# Patient Record
Sex: MALE | Race: WHITE | NOT HISPANIC OR LATINO
[De-identification: names, ages, dates, MRNs, and addresses within clinical notes are randomized per-mention and may not be internally consistent; named-entity substitution may affect disease eponyms.]

---

## 2017-02-07 ENCOUNTER — TRANSCRIPTION ENCOUNTER (OUTPATIENT)
Age: 82
End: 2017-02-07

## 2017-02-10 ENCOUNTER — EMERGENCY (EMERGENCY)
Facility: HOSPITAL | Age: 82
LOS: 1 days | Discharge: HOME | End: 2017-02-10
Attending: EMERGENCY MEDICINE | Admitting: EMERGENCY MEDICINE
Payer: MEDICARE

## 2017-02-10 VITALS
RESPIRATION RATE: 19 BRPM | DIASTOLIC BLOOD PRESSURE: 86 MMHG | SYSTOLIC BLOOD PRESSURE: 153 MMHG | TEMPERATURE: 97 F | OXYGEN SATURATION: 95 % | WEIGHT: 162.92 LBS | HEART RATE: 64 BPM | HEIGHT: 66 IN

## 2017-02-10 DIAGNOSIS — M54.9 DORSALGIA, UNSPECIFIED: ICD-10-CM

## 2017-02-10 DIAGNOSIS — Z98.890 OTHER SPECIFIED POSTPROCEDURAL STATES: ICD-10-CM

## 2017-02-10 DIAGNOSIS — E78.5 HYPERLIPIDEMIA, UNSPECIFIED: ICD-10-CM

## 2017-02-10 PROCEDURE — 99283 EMERGENCY DEPT VISIT LOW MDM: CPT

## 2017-02-10 PROCEDURE — 99283 EMERGENCY DEPT VISIT LOW MDM: CPT | Mod: GC

## 2017-02-10 RX ORDER — ACETAMINOPHEN 500 MG
650 TABLET ORAL ONCE
Qty: 0 | Refills: 0 | Status: COMPLETED | OUTPATIENT
Start: 2017-02-10 | End: 2017-02-10

## 2017-02-10 RX ORDER — OXYCODONE HYDROCHLORIDE 5 MG/1
1 TABLET ORAL
Qty: 12 | Refills: 0 | OUTPATIENT
Start: 2017-02-10 | End: 2017-02-13

## 2017-02-10 RX ORDER — OXYCODONE HYDROCHLORIDE 5 MG/1
5 TABLET ORAL ONCE
Qty: 0 | Refills: 0 | Status: DISCONTINUED | OUTPATIENT
Start: 2017-02-10 | End: 2017-02-10

## 2017-02-10 RX ADMIN — Medication 650 MILLIGRAM(S): at 07:42

## 2017-02-10 RX ADMIN — OXYCODONE HYDROCHLORIDE 5 MILLIGRAM(S): 5 TABLET ORAL at 07:42

## 2017-02-10 NOTE — ED PROVIDER NOTE - OBJECTIVE STATEMENT
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 Patient had similar pain in his left leg 2 weeks prior that has since resolved. At the time had gone to doctor and was prescribed muscle relaxer and ibuprofen with improvement in symptoms.  Patient had an MRI 1 month prior of his entire spine from his orthopedist without any significant disease. Also had a recent bladder biopsy which was negative for disease Took ibuprofen this morning with no relief. No urinary symptoms, no fevers, no chills, no weight loss, no fatigue, no night sweats, no nausea/vomiting, no diarrhea, no trauma, no weakness, no numbness/tingling. 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 Patient had similar pain in his left leg 2 weeks prior that has since resolved. At the time had gone to doctor and was prescribed muscle relaxer and ibuprofen with improvement in symptoms.  Patient had an MRI 1 month prior of his entire spine from his orthopedist without any significant disease. Also had a recent bladder biopsy which was negative for disease Took ibuprofen this morning with no relief. No urinary symptoms, no fevers, no chills, no weight loss, no fatigue, no night sweats, no nausea/vomiting, no diarrhea, no trauma, no weakness, no numbness/tingling, NO HIP pain.

## 2017-02-10 NOTE — ED ADULT NURSE NOTE - PSH
1960's surgery for herniated lumbar disc---re op 3 years later due to scar tissue    H/O hydrocephalus  sp placement of shunt  January 2009   surgery for C4-C6 Disc--no hardware    October 2010  left Carpal Tunnel release/Syndrome    S/P Tonsillectomy

## 2017-02-10 NOTE — ED PROVIDER NOTE - ATTENDING CONTRIBUTION TO CARE
80 yo male with pain radiating from R buttock down the back of the right leg to the knee; had similar pain on the L side recently, now resolved.  Also had MRI of the spine 1 month ago, normal per patient.  Very well appearing on exam.  Consistent with sciatica -- medicate and reassess, outpatient PT as needed.  No emergent spinal pathology.

## 2017-02-10 NOTE — ED ADULT NURSE NOTE - PMH
? alcohol abuse--admits to 1 vodka martini per day    Dyslipidemia    Hypercholesterolemia    Normal Pressure Hydrocephalus, as per medical consult--altered gait, uses a cane and "forgetfulness"    personal h/o Hematuria--etiology unknown after urology work up  patient does not recall this

## 2017-02-10 NOTE — ED PROVIDER NOTE - MEDICAL DECISION MAKING DETAILS
80 y/o male with back pain radiating down right leg with benign neurological exam, and no improvement with motrin. Likely sciatica . Will trial oxycodone for pain control.

## 2017-08-04 ENCOUNTER — TRANSCRIPTION ENCOUNTER (OUTPATIENT)
Age: 82
End: 2017-08-04

## 2017-08-28 ENCOUNTER — APPOINTMENT (OUTPATIENT)
Dept: CT IMAGING | Facility: IMAGING CENTER | Age: 82
End: 2017-08-28

## 2017-09-26 ENCOUNTER — OUTPATIENT (OUTPATIENT)
Dept: OUTPATIENT SERVICES | Facility: HOSPITAL | Age: 82
LOS: 1 days | End: 2017-09-26
Payer: MEDICARE

## 2017-09-26 ENCOUNTER — APPOINTMENT (OUTPATIENT)
Dept: CT IMAGING | Facility: IMAGING CENTER | Age: 82
End: 2017-09-26
Payer: MEDICARE

## 2017-09-26 DIAGNOSIS — Z00.8 ENCOUNTER FOR OTHER GENERAL EXAMINATION: ICD-10-CM

## 2017-09-26 PROCEDURE — 70450 CT HEAD/BRAIN W/O DYE: CPT | Mod: 26

## 2017-09-26 PROCEDURE — 70450 CT HEAD/BRAIN W/O DYE: CPT

## 2018-04-23 ENCOUNTER — TRANSCRIPTION ENCOUNTER (OUTPATIENT)
Age: 83
End: 2018-04-23

## 2018-04-23 NOTE — ASU PATIENT PROFILE, ADULT - PSH
1960's surgery for herniated lumbar disc---re op 3 years later due to scar tissue    H/O hydrocephalus  sp placement of shunt  History of colonoscopy    January 2009   surgery for C4-C6 Disc--no hardware    October 2010  left Carpal Tunnel release/Syndrome    S/P Tonsillectomy

## 2018-04-23 NOTE — ASU PATIENT PROFILE, ADULT - PMH
Dyslipidemia    Hypercholesterolemia    Normal Pressure Hydrocephalus, as per medical consult--altered gait, uses a cane and "forgetfulness"    personal h/o Hematuria--etiology unknown after urology work up  patient does not recall this

## 2018-04-24 ENCOUNTER — OUTPATIENT (OUTPATIENT)
Dept: OUTPATIENT SERVICES | Facility: HOSPITAL | Age: 83
LOS: 1 days | End: 2018-04-24
Payer: MEDICARE

## 2018-04-24 VITALS
DIASTOLIC BLOOD PRESSURE: 87 MMHG | RESPIRATION RATE: 14 BRPM | HEART RATE: 61 BPM | OXYGEN SATURATION: 95 % | SYSTOLIC BLOOD PRESSURE: 153 MMHG

## 2018-04-24 VITALS
OXYGEN SATURATION: 97 % | SYSTOLIC BLOOD PRESSURE: 153 MMHG | DIASTOLIC BLOOD PRESSURE: 87 MMHG | TEMPERATURE: 98 F | HEART RATE: 63 BPM | WEIGHT: 142.42 LBS | HEIGHT: 67 IN | RESPIRATION RATE: 16 BRPM

## 2018-04-24 DIAGNOSIS — H25.811 COMBINED FORMS OF AGE-RELATED CATARACT, RIGHT EYE: ICD-10-CM

## 2018-04-24 DIAGNOSIS — Z98.890 OTHER SPECIFIED POSTPROCEDURAL STATES: Chronic | ICD-10-CM

## 2018-04-24 PROCEDURE — 66984 XCAPSL CTRC RMVL W/O ECP: CPT | Mod: RT

## 2018-04-24 PROCEDURE — V2632: CPT

## 2018-04-24 NOTE — ASU DISCHARGE PLAN (ADULT/PEDIATRIC). - NOTIFY
Bleeding that does not stop/Fever greater than 101/Swelling that continues/Pain not relieved by Medications/Persistent Nausea and Vomiting

## 2018-10-15 ENCOUNTER — TRANSCRIPTION ENCOUNTER (OUTPATIENT)
Age: 83
End: 2018-10-15

## 2018-10-24 ENCOUNTER — APPOINTMENT (OUTPATIENT)
Dept: ORTHOPEDIC SURGERY | Facility: CLINIC | Age: 83
End: 2018-10-24
Payer: MEDICARE

## 2018-10-24 VITALS
HEART RATE: 81 BPM | DIASTOLIC BLOOD PRESSURE: 78 MMHG | BODY MASS INDEX: 25.71 KG/M2 | SYSTOLIC BLOOD PRESSURE: 132 MMHG | WEIGHT: 160 LBS | HEIGHT: 66 IN

## 2018-10-24 DIAGNOSIS — Z87.39 PERSONAL HISTORY OF OTHER DISEASES OF THE MUSCULOSKELETAL SYSTEM AND CONNECTIVE TISSUE: ICD-10-CM

## 2018-10-24 DIAGNOSIS — Z87.891 PERSONAL HISTORY OF NICOTINE DEPENDENCE: ICD-10-CM

## 2018-10-24 DIAGNOSIS — Z78.9 OTHER SPECIFIED HEALTH STATUS: ICD-10-CM

## 2018-10-24 DIAGNOSIS — Z85.51 PERSONAL HISTORY OF MALIGNANT NEOPLASM OF BLADDER: ICD-10-CM

## 2018-10-24 PROCEDURE — 73552 X-RAY EXAM OF FEMUR 2/>: CPT | Mod: RT

## 2018-10-24 PROCEDURE — 99203 OFFICE O/P NEW LOW 30 MIN: CPT

## 2018-10-24 PROCEDURE — 73562 X-RAY EXAM OF KNEE 3: CPT | Mod: RT

## 2018-10-24 RX ORDER — PRAVASTATIN SODIUM 20 MG/1
20 TABLET ORAL
Qty: 90 | Refills: 0 | Status: DISCONTINUED | COMMUNITY
Start: 2018-06-01

## 2018-10-24 RX ORDER — BENZONATATE 100 MG/1
100 CAPSULE ORAL
Qty: 30 | Refills: 0 | Status: ACTIVE | COMMUNITY
Start: 2018-10-14

## 2018-10-24 RX ORDER — PRAVASTATIN SODIUM 40 MG/1
40 TABLET ORAL
Qty: 90 | Refills: 0 | Status: DISCONTINUED | COMMUNITY
Start: 2018-06-01

## 2018-10-24 RX ORDER — PRAVASTATIN SODIUM 40 MG/1
TABLET ORAL
Refills: 0 | Status: ACTIVE | COMMUNITY

## 2018-10-24 RX ORDER — PREDNISOLONE ACETATE 10 MG/ML
1 SUSPENSION/ DROPS OPHTHALMIC
Qty: 10 | Refills: 0 | Status: DISCONTINUED | COMMUNITY
Start: 2018-04-25

## 2018-10-24 RX ORDER — METRONIDAZOLE 7.5 MG/G
0.75 CREAM TOPICAL
Qty: 45 | Refills: 0 | Status: ACTIVE | COMMUNITY
Start: 2018-09-05

## 2019-03-27 ENCOUNTER — APPOINTMENT (OUTPATIENT)
Dept: MRI IMAGING | Facility: CLINIC | Age: 84
End: 2019-03-27
Payer: MEDICARE

## 2019-03-27 ENCOUNTER — OUTPATIENT (OUTPATIENT)
Dept: OUTPATIENT SERVICES | Facility: HOSPITAL | Age: 84
LOS: 1 days | End: 2019-03-27
Payer: MEDICARE

## 2019-03-27 DIAGNOSIS — Z00.8 ENCOUNTER FOR OTHER GENERAL EXAMINATION: ICD-10-CM

## 2019-03-27 DIAGNOSIS — Z98.890 OTHER SPECIFIED POSTPROCEDURAL STATES: Chronic | ICD-10-CM

## 2019-03-27 PROCEDURE — 72146 MRI CHEST SPINE W/O DYE: CPT | Mod: 26

## 2019-03-27 PROCEDURE — 72146 MRI CHEST SPINE W/O DYE: CPT

## 2019-03-27 PROCEDURE — 72141 MRI NECK SPINE W/O DYE: CPT

## 2019-03-27 PROCEDURE — 72141 MRI NECK SPINE W/O DYE: CPT | Mod: 26

## 2019-04-24 NOTE — ASU PREOP CHECKLIST - BP NONINVASIVE DIASTOLIC (MM HG)
87

## 2020-04-01 ENCOUNTER — APPOINTMENT (OUTPATIENT)
Dept: CT IMAGING | Facility: IMAGING CENTER | Age: 85
End: 2020-04-01

## 2023-10-31 NOTE — ED ADULT NURSE NOTE - HARM RISK FACTORS
Pt admitted to garden floor in room G16 with wife. Pt arrived with temperature of 101.8, HR in the 110-130s, /65. Tylenol given at 2125. Blood sugar was 76; insulin and lantus held and apple juice and crackers given. Recheck was 77, MD aware. Temperature at 2351 was 100.2. BP dropped to 83/51, MD notified- 500 mL bolus given. EKG ordered and pt was running Afib with RVR, MD notified and metoprolol was ordered but not given due to low BP. BP rechecked after bolus and was 80/53 and HR in 's. O2 statting at 88% and would not come above 90%, pt placed on 2L NC. MD ordered Digoxin 125 mcg and 1 L bolus. After 1L completed BP rechecked was 73/48, MD notified and order to transfer pt to ICU were given. Report given to ICU RN. Pt transfer with NS running at 100 ml/hr, Cefepime, and Magnesium running. Last Blood sugar before transfer was 66, MD notified, see orders. Patient lethargic but responsive with HR in 80's still running Afib.   no